# Patient Record
Sex: MALE | Race: ASIAN | Employment: UNEMPLOYED | URBAN - METROPOLITAN AREA
[De-identification: names, ages, dates, MRNs, and addresses within clinical notes are randomized per-mention and may not be internally consistent; named-entity substitution may affect disease eponyms.]

---

## 2017-04-18 LAB
CREAT SERPL-MCNC: 0.81 MG/DL (ref 0.72–1.25)
GFR SERPL CREATININE-BSD FRML MDRD: >60 ML/MIN/1.73M2

## 2017-10-24 LAB
GLUCOSE SERPL-MCNC: 82 MG/DL (ref 65–100)
POTASSIUM SERPL-SCNC: 3.9 MMOL/L (ref 3.5–5)

## 2017-10-26 LAB
CHOLEST SERPL-MCNC: 138 MG/DL (ref 100–199)
HDLC SERPL-MCNC: 61 MG/DL
LDLC SERPL CALC-MCNC: 60 MG/DL
NONHDLC SERPL-MCNC: 77 MG/DL
TRIGL SERPL-MCNC: 85 MG/DL

## 2018-01-17 ENCOUNTER — TRANSFERRED RECORDS (OUTPATIENT)
Dept: HEALTH INFORMATION MANAGEMENT | Facility: CLINIC | Age: 33
End: 2018-01-17

## 2018-01-17 LAB — INR PPP: 1

## 2019-01-25 ENCOUNTER — OFFICE VISIT (OUTPATIENT)
Dept: FAMILY MEDICINE | Facility: CLINIC | Age: 34
End: 2019-01-25
Payer: MEDICAID

## 2019-01-25 VITALS
DIASTOLIC BLOOD PRESSURE: 81 MMHG | HEIGHT: 65 IN | WEIGHT: 205.2 LBS | TEMPERATURE: 97.5 F | OXYGEN SATURATION: 96 % | BODY MASS INDEX: 34.19 KG/M2 | HEART RATE: 68 BPM | SYSTOLIC BLOOD PRESSURE: 122 MMHG

## 2019-01-25 DIAGNOSIS — Z23 NEED FOR PROPHYLACTIC VACCINATION AND INOCULATION AGAINST INFLUENZA: ICD-10-CM

## 2019-01-25 DIAGNOSIS — Z00.00 ENCOUNTER FOR ROUTINE ADULT HEALTH EXAMINATION WITHOUT ABNORMAL FINDINGS: Primary | ICD-10-CM

## 2019-01-25 DIAGNOSIS — Z73.9 DIFFICULTY WITH LIFE MANAGEMENT: ICD-10-CM

## 2019-01-25 DIAGNOSIS — Z11.3 SCREEN FOR STD (SEXUALLY TRANSMITTED DISEASE): ICD-10-CM

## 2019-01-25 LAB
CHOLEST SERPL-MCNC: 153 MG/DL
GLUCOSE SERPL-MCNC: 101 MG/DL (ref 70–99)
HDLC SERPL-MCNC: 66 MG/DL
LDLC SERPL CALC-MCNC: 62 MG/DL
NONHDLC SERPL-MCNC: 87 MG/DL
TRIGL SERPL-MCNC: 125 MG/DL

## 2019-01-25 PROCEDURE — 87591 N.GONORRHOEAE DNA AMP PROB: CPT | Performed by: PHYSICIAN ASSISTANT

## 2019-01-25 PROCEDURE — 90471 IMMUNIZATION ADMIN: CPT | Performed by: PHYSICIAN ASSISTANT

## 2019-01-25 PROCEDURE — 36415 COLL VENOUS BLD VENIPUNCTURE: CPT | Performed by: PHYSICIAN ASSISTANT

## 2019-01-25 PROCEDURE — 86780 TREPONEMA PALLIDUM: CPT | Performed by: PHYSICIAN ASSISTANT

## 2019-01-25 PROCEDURE — 87491 CHLMYD TRACH DNA AMP PROBE: CPT | Performed by: PHYSICIAN ASSISTANT

## 2019-01-25 PROCEDURE — 99395 PREV VISIT EST AGE 18-39: CPT | Mod: 25 | Performed by: PHYSICIAN ASSISTANT

## 2019-01-25 PROCEDURE — 82947 ASSAY GLUCOSE BLOOD QUANT: CPT | Performed by: PHYSICIAN ASSISTANT

## 2019-01-25 PROCEDURE — 90686 IIV4 VACC NO PRSV 0.5 ML IM: CPT | Performed by: PHYSICIAN ASSISTANT

## 2019-01-25 PROCEDURE — 87389 HIV-1 AG W/HIV-1&-2 AB AG IA: CPT | Performed by: PHYSICIAN ASSISTANT

## 2019-01-25 PROCEDURE — 80061 LIPID PANEL: CPT | Performed by: PHYSICIAN ASSISTANT

## 2019-01-25 ASSESSMENT — MIFFLIN-ST. JEOR: SCORE: 1810.78

## 2019-01-25 NOTE — PROGRESS NOTES
"  SUBJECTIVE:   CC: Jorge Lerner is an 33 year old male who presents for preventive health visit.     Healthy Habits:    Do you get at least three servings of calcium containing foods daily (dairy, green leafy vegetables, etc.)? yes    Amount of exercise or daily activities, outside of work: No    Problems taking medications regularly No    Medication side effects: No    Have you had an eye exam in the past two years? no    Do you see a dentist twice per year? No - has insurance     Do you have sleep apnea, excessive snoring or daytime drowsiness?no      PROBLEMS TO ADD ON...  Patient informed that anything we discuss that is not related to preventative medicine, may be billed for; patient verbalizes understanding.    Fasting for labs    Red hue to hands  Denies pain  They do not turn white or purple  Not necessarily worse in cold or hot weather    Would like a referral for counseling  Feels \"stuck\" in life    STD screen  Had intercourse with a new partner recently  Randomly the head of his penis will have discomfort  Denies penile discharge and dysuria    -------------------------------------    Today's PHQ-2 Score:   PHQ-2 ( 1999 Pfizer) 1/25/2019   Q1: Little interest or pleasure in doing things 0   Q2: Feeling down, depressed or hopeless 0   PHQ-2 Score 0       Abuse: Current or Past(Physical, Sexual or Emotional)- No  Do you feel safe in your environment? Yes    Social History     Tobacco Use     Smoking status: Current Every Day Smoker     Types: Cigarettes     Smokeless tobacco: Never Used   Substance Use Topics     Alcohol use: No     If you drink alcohol do you typically have >3 drinks per day or >7 drinks per week? Yes - AUDIT SCORE:  11  AUDIT - Alcohol Use Disorders Identification Test - Reproduced from the World Health Organization Audit 2001 (Second Edition) 1/25/2019   1.  How often do you have a drink containing alcohol? 4 or more times a week   2.  How many drinks containing alcohol do you have on a " typical day when you are drinking? 5 or 6   3.  How often do you have five or more drinks on one occasion? Weekly   4.  How often during the last year have you found that you were not able to stop drinking once you had started? Never   5.  How often during the last year have you failed to do what was normally expected of you because of drinking? Never   6.  How often during the last year have you needed a first drink in the morning to get yourself going after a heavy drinking session? Never   7.  How often during the last year have you had a feeling of guilt or remorse after drinking? Never   8.  How often during the last year have you been unable to remember what happened the night before because of your drinking? Monthly   9.  Have you or someone else been injured because of your drinking? No   10. Has a relative, friend, doctor or other health care worker been concerned about your drinking or suggested you cut down? No   TOTAL SCORE 11                         Last PSA: No results found for: PSA    Reviewed orders with patient. Reviewed health maintenance and updated orders accordingly - Yes  Labs reviewed in EPIC    Reviewed and updated as needed this visit by clinical staff  Allergies  Meds         Reviewed and updated as needed this visit by Provider        No past medical history on file.     ROS:  Other than what is noted in the HPI and PMH a complete review of systems is otherwise negative including: Constitutional, HEENT, endocrine, cardiovascular, respiratory, GI/, musculoskeletal, neuro, and psychiatric.     OBJECTIVE:   There were no vitals taken for this visit.  EXAM:  GENERAL: healthy, alert and no distress  EYES: Eyes grossly normal to inspection, PERRL and conjunctivae and sclerae normal  HENT: ear canals and TM's normal, nose and mouth without ulcers or lesions  NECK: no adenopathy, no asymmetry, masses, or scars and thyroid normal to palpation  RESP: lungs clear to auscultation - no rales,  "rhonchi or wheezes  CV: regular rate and rhythm, normal S1 S2, no S3 or S4, no murmur, click or rub, no peripheral edema and peripheral pulses strong  ABDOMEN: soft, nontender, no hepatosplenomegaly, no masses and bowel sounds normal  MS: no gross musculoskeletal defects noted, no edema  SKIN: no suspicious lesions or rashes  NEURO: Normal strength and tone, mentation intact and speech normal  PSYCH: mentation appears normal, affect normal/bright    ASSESSMENT/PLAN:       ICD-10-CM    1. Encounter for routine adult health examination without abnormal findings Z00.00 Lipid panel reflex to direct LDL Fasting     Glucose     OPTOMETRY REFERRAL   2. Need for prophylactic vaccination and inoculation against influenza Z23 HC FLU VAC PRESRV FREE QUAD SPLIT VIR 3+YRS IM  [60018]          ADMIN VACCINE, FIRST [14596]   3. Screen for STD (sexually transmitted disease) Z11.3 Neisseria gonorrhoeae PCR     HIV Antigen Antibody Combo     Chlamydia trachomatis PCR     Treponema Abs w Reflex to RPR and Titer   4. Difficulty with life management Z73.9 MENTAL HEALTH REFERRAL  - Adult; Outpatient Treatment; Individual/Couples/Family/Group Therapy/Health Psychology; [facility] [place]: [facility] [phone]; We will contact you to schedule the appointment or please call with any questions       STD screen and labs today.  Referral to counseling.  Follow up in 3 years if labs normal.      COUNSELING:  Reviewed preventive health counseling, as reflected in patient instructions    BP Readings from Last 1 Encounters:   07/03/15 126/74     Estimated body mass index is 27.6 kg/m  as calculated from the following:    Height as of 7/3/15: 1.676 m (5' 6\").    Weight as of 7/3/15: 77.6 kg (171 lb).     reports that he has been smoking cigarettes.  he has never used smokeless tobacco.      Counseling Resources:  ATP IV Guidelines  Pooled Cohorts Equation Calculator  FRAX Risk Assessment  ICSI Preventive Guidelines  Dietary Guidelines for " Americans, 2010  USDA's MyPlate  ASA Prophylaxis  Lung CA Screening    Martha Stone PA-C  [facility] [place]

## 2019-01-25 NOTE — NURSING NOTE
Injectable Influenza Immunization Documentation    1. Is the person to be vaccinated sick today? no    2. Does the person to be vaccinated have an allergy to eggs or a component of the vaccine? no    3. Has the person to be vaccinated today ever had a serious reaction to an influenza vaccine in the past? no    4. Has the person to be vaccinated ever had [place] syndrome? no    Form completed by Sarah Trammell CMA

## 2019-01-25 NOTE — LETTER
January 28, 2019      Jorge Lerner  [address]  [place] [address]        Dear ,    We are writing to inform you of your test results.    Your STD screen is negative.   Your blood sugar is elevated by just 2 points. Cutting back on carbs/starches is important to prevent the development of diabetes.   Your LDL (bad) cholesterol is <160 and therefore at goal - no signs of high cholesterol.     Resulted Orders   Lipid panel reflex to direct LDL Fasting   Result Value Ref Range    Cholesterol 153 <200 mg/dL    Triglycerides 125 <150 mg/dL      Comment:      Fasting specimen    HDL Cholesterol 66 >39 mg/dL    LDL Cholesterol Calculated 62 <100 mg/dL      Comment:      Desirable:       <100 mg/dl    Non HDL Cholesterol 87 <130 mg/dL   Glucose   Result Value Ref Range    Glucose 101 (H) 70 - 99 mg/dL      Comment:      Fasting specimen   Neisseria gonorrhoeae PCR   Result Value Ref Range    Specimen Descrip Urine     N Gonorrhea PCR Negative NEG^Negative      Comment:      Negative for N. gonorrhoeae rRNA by transcription mediated amplification.  A negative result by transcription mediated amplification does not preclude   the presence of N. gonorrhoeae infection because results are dependent on   proper and adequate collection, absence of inhibitors, and sufficient rRNA to   be detected.     HIV Antigen Antibody Combo   Result Value Ref Range    HIV Antigen Antibody Combo Nonreactive NR^Nonreactive          Comment:      HIV-1 p24 Ag & HIV-1/HIV-2 Ab Not Detected   Chlamydia trachomatis PCR   Result Value Ref Range    Specimen Description Urine     Chlamydia Trachomatis PCR Negative NEG^Negative      Comment:      Negative for C. trachomatis rRNA by transcription mediated amplification.  A negative result by transcription mediated amplification does not preclude   the presence of C. trachomatis infection because results are dependent on   proper and adequate collection, absence of inhibitors, and sufficient rRNA to    be detected.     Treponema Abs w Reflex to RPR and Titer   Result Value Ref Range    Treponema Antibodies Nonreactive NR^Nonreactive       If you have any questions or concerns, please call the clinic at the number listed above.       Sincerely,        Martha Stone PA-C/raduo

## 2019-01-26 LAB — T PALLIDUM AB SER QL: NONREACTIVE

## 2019-01-27 LAB
C TRACH DNA SPEC QL NAA+PROBE: NEGATIVE
N GONORRHOEA DNA SPEC QL NAA+PROBE: NEGATIVE
SPECIMEN SOURCE: NORMAL
SPECIMEN SOURCE: NORMAL

## 2019-01-28 LAB — HIV 1+2 AB+HIV1 P24 AG SERPL QL IA: NONREACTIVE

## 2019-01-28 NOTE — RESULT ENCOUNTER NOTE
Please send the following letter to the patient:    Jorge,    Your STD screen is negative.  Your blood sugar is elevated by just 2 points. Cutting back on carbs/starches is important to prevent the development of diabetes.   Your LDL (bad) cholesterol is <160 and therefore at goal - no signs of high cholesterol.     Please call me with any questions or concerns.          Martha Stone PA-C

## 2019-01-29 ENCOUNTER — TELEPHONE (OUTPATIENT)
Dept: FAMILY MEDICINE | Facility: CLINIC | Age: 34
End: 2019-01-29

## 2019-01-29 NOTE — TELEPHONE ENCOUNTER
Notes recorded by Martha Stone PA-C on 1/28/2019 at 11:13 AM CST  Please send the following letter to the patient:    Jorge,    Your STD screen is negative.  Your blood sugar is elevated by just 2 points. Cutting back on carbs/starches is important to prevent the development of diabetes.   Your LDL (bad) cholesterol is <160 and therefore at goal - no signs of high cholesterol.       Patient informed of results, verbalized understanding and had no further questions or concerns. Informed letter mailed to patient 1/28/19

## 2019-02-12 ENCOUNTER — OFFICE VISIT (OUTPATIENT)
Dept: OTOLARYNGOLOGY | Facility: CLINIC | Age: 34
End: 2019-02-12
Payer: MEDICAID

## 2019-02-12 VITALS
WEIGHT: 205 LBS | DIASTOLIC BLOOD PRESSURE: 85 MMHG | HEART RATE: 83 BPM | BODY MASS INDEX: 34.16 KG/M2 | HEIGHT: 65 IN | OXYGEN SATURATION: 97 % | SYSTOLIC BLOOD PRESSURE: 125 MMHG

## 2019-02-12 DIAGNOSIS — H60.391 INFECTIVE OTITIS EXTERNA, RIGHT: Primary | ICD-10-CM

## 2019-02-12 PROCEDURE — 99213 OFFICE O/P EST LOW 20 MIN: CPT | Performed by: OTOLARYNGOLOGY

## 2019-02-12 RX ORDER — NEOMYCIN SULFATE, POLYMYXIN B SULFATE, HYDROCORTISONE 3.5; 10000; 1 MG/ML; [USP'U]/ML; MG/ML
3-4 SOLUTION/ DROPS AURICULAR (OTIC) 3 TIMES DAILY
Qty: 10 ML | Refills: 0 | Status: SHIPPED | OUTPATIENT
Start: 2019-02-12 | End: 2019-02-19

## 2019-02-12 ASSESSMENT — MIFFLIN-ST. JEOR: SCORE: 1809.84

## 2019-02-12 NOTE — PATIENT INSTRUCTIONS
General Scheduling Information  To schedule your CT/MRI scan, please contact [place] [facility] at [phone]   [address] [place] [address]    To schedule your Surgery, please contact our Specialty Schedulers at [phone]    ENT Clinic Locations Clinic Hours Telephone Number     [facility]  [address] [place] [address]   Tuesday:       8:00am -- 4:00pm    Wednesday:  8:00am - 4:00pm   To schedule an appointment with   Dr. Camacho,   please contact our   Specialty Scheduling Department at:     [phone]       [facility]ell  [address] [place] [address]   Friday:          8:00am - 4:00pm         Urgent Care Locations Clinic Hours Telephone Numbers     [facility] Orlando  [address] [place] [address]     Monday-Friday:     11:00pm - 9:00pm    Saturday-Sunday:  9:00am - 5:00pm   [phone]     [facility] Sewell  [address] [place] [address]     Monday-Friday:      5:00pm - 9:00pm     Saturday-Sunday:  9:00am - 5:00pm   [phone]

## 2019-02-12 NOTE — LETTER
2/12/2019         RE: Jorge Lerner  [address] [facility] [address]        Dear Colleague,    Thank you for referring your patient, Jorge Lerner, to the [facility]. Please see a copy of my visit note below.    Chief Complaint - right ear itching    History of Present Illness - Jorge Lerner is a 33 year old male who presents to me today with itching right ear. No pain or hearing loss. This has been intermittent for years. He has seen Dr. Diaz for this in the past. He was given ciprodex then for otitis externa. Left ear feels fine. No otorrhea. He denies eczema, but sometimes has dry hands.     Past Medical History -   Patient Active Problem List   Diagnosis     Eczematous dermatitis       Current Medications -   Current Outpatient Medications:      neomycin-polymyxin-hydrocortisone (CORTISPORIN) 3.5-61559-0 otic solution, Place 3-4 drops into the right ear 3 times daily for 7 days, Disp: 10 mL, Rfl: 0     ibuprofen (ADVIL,MOTRIN) 800 MG tablet, Take 1 tablet by mouth every 8 hours as needed for pain., Disp: 30 tablet, Rfl: 0     NO ACTIVE MEDICATIONS, , Disp: , Rfl:     Allergies - No Known Allergies    Social History -   Social History     Socioeconomic History     Marital status: Single     Spouse name: None     Number of children: None     Years of education: None     Highest education level: None   Social Needs     Financial resource strain: None     Food insecurity - worry: None     Food insecurity - inability: None     Transportation needs - medical: None     Transportation needs - non-medical: None   Occupational History     None   Tobacco Use     Smoking status: Current Every Day Smoker     Types: Cigarettes     Smokeless tobacco: Never Used   Substance and Sexual Activity     Alcohol use: No     Drug use: No     Sexual activity: None   Other Topics Concern     Parent/sibling w/ CABG, MI or angioplasty before 65F 55M? Not Asked   Social History Narrative     None       Family History -   Family  "History   Problem Relation Age of Onset     Hypertension Mother      Hypertension Maternal Uncle        Review of Systems - As per HPI and PMHx, otherwise 7 system review of the head and neck negative.    Physical Exam  /85   Pulse 83   Ht 1.648 m (5' 4.88\")   Wt 93 kg (205 lb)   SpO2 97%   BMI 34.24 kg/m     General - The patient is in no distress.  Alert and oriented to person and place, answers questions and cooperates with examination appropriately.   Voice and Breathing - The patient was breathing comfortably without the use of accessory muscles. There was no wheezing, stridor, or stertor.  The patients voice was clear and strong.  Ears - The right ear was examined. It showed erythema and edema of the medial canal. There was moist debri ion the tympanic membrane. Using the binocular microscope I used a #5 suction and suctioned and debrided the otorrhea and debri deep in the canal. I could then see the tympanic membrane. It appeared intact. No evidence of middle ear effusion. The left ear was then examined. The canal was nonedematous and nonerythematous. No evidence of infection. The tympanic membrane was intact and the middle ear was well aerated.   Eyes - Extraocular movements intact.  Sclera were not icteric or injected.  Neck - Palpation of the occipital, submental, submandibular, internal jugular chain, and supraclavicular nodes did not demonstrateany abnormal lymph nodes or masses. No parotid masses. Palpation of the thyroid was soft and smooth, with no nodules or goiter appreciated.  The trachea was mobile and midline.  Neurological - Cranial nerves 2 through 12 were grossly intact. House-Brackmann grade 1 out of 6 bilaterally.      Assessment and Plan - Jorge Lerner is a 33 year old male has a right ear otitis externa. It seems mostly confined to the medial canal. I debrided the canal under the microscope. I will prescribe cortisporin. If this fails he will contact me.     Milton Camacho " MD  Otolaryngology  [facility]        Again, thank you for allowing me to participate in the care of your patient.        Sincerely,        Milton Camacho MD

## 2019-02-12 NOTE — PROGRESS NOTES
Chief Complaint - right ear itching    History of Present Illness - Jorge Lerner is a 33 year old male who presents to me today with itching right ear. No pain or hearing loss. This has been intermittent for years. He has seen Dr. Diaz for this in the past. He was given ciprodex then for otitis externa. Left ear feels fine. No otorrhea. He denies eczema, but sometimes has dry hands.     Past Medical History -   Patient Active Problem List   Diagnosis     Eczematous dermatitis       Current Medications -   Current Outpatient Medications:      neomycin-polymyxin-hydrocortisone (CORTISPORIN) 3.5-31285-0 otic solution, Place 3-4 drops into the right ear 3 times daily for 7 days, Disp: 10 mL, Rfl: 0     ibuprofen (ADVIL,MOTRIN) 800 MG tablet, Take 1 tablet by mouth every 8 hours as needed for pain., Disp: 30 tablet, Rfl: 0     NO ACTIVE MEDICATIONS, , Disp: , Rfl:     Allergies - No Known Allergies    Social History -   Social History     Socioeconomic History     Marital status: Single     Spouse name: None     Number of children: None     Years of education: None     Highest education level: None   Social Needs     Financial resource strain: None     Food insecurity - worry: None     Food insecurity - inability: None     Transportation needs - medical: None     Transportation needs - non-medical: None   Occupational History     None   Tobacco Use     Smoking status: Current Every Day Smoker     Types: Cigarettes     Smokeless tobacco: Never Used   Substance and Sexual Activity     Alcohol use: No     Drug use: No     Sexual activity: None   Other Topics Concern     Parent/sibling w/ CABG, MI or angioplasty before 65F 55M? Not Asked   Social History Narrative     None       Family History -   Family History   Problem Relation Age of Onset     Hypertension Mother      Hypertension Maternal Uncle        Review of Systems - As per HPI and PMHx, otherwise 7 system review of the head and neck negative.    Physical Exam  BP  "125/85   Pulse 83   Ht 1.648 m (5' 4.88\")   Wt 93 kg (205 lb)   SpO2 97%   BMI 34.24 kg/m    General - The patient is in no distress.  Alert and oriented to person and place, answers questions and cooperates with examination appropriately.   Voice and Breathing - The patient was breathing comfortably without the use of accessory muscles. There was no wheezing, stridor, or stertor.  The patients voice was clear and strong.  Ears - The right ear was examined. It showed erythema and edema of the medial canal. There was moist debri ion the tympanic membrane. Using the binocular microscope I used a #5 suction and suctioned and debrided the otorrhea and debri deep in the canal. I could then see the tympanic membrane. It appeared intact. No evidence of middle ear effusion. The left ear was then examined. The canal was nonedematous and nonerythematous. No evidence of infection. The tympanic membrane was intact and the middle ear was well aerated.   Eyes - Extraocular movements intact.  Sclera were not icteric or injected.  Neck - Palpation of the occipital, submental, submandibular, internal jugular chain, and supraclavicular nodes did not demonstrateany abnormal lymph nodes or masses. No parotid masses. Palpation of the thyroid was soft and smooth, with no nodules or goiter appreciated.  The trachea was mobile and midline.  Neurological - Cranial nerves 2 through 12 were grossly intact. House-Brackmann grade 1 out of 6 bilaterally.      Assessment and Plan - Jorge Lerner is a 33 year old male has a right ear otitis externa. It seems mostly confined to the medial canal. I debrided the canal under the microscope. I will prescribe cortisporin. If this fails he will contact me.     Milton Camacho MD  Otolaryngology  [facility]      "

## 2020-07-15 ENCOUNTER — NURSE TRIAGE (OUTPATIENT)
Dept: NURSING | Facility: CLINIC | Age: 35
End: 2020-07-15

## 2020-07-15 NOTE — TELEPHONE ENCOUNTER
Patient called with a request to talk to a therapist or counselor. He denied any panic attacks or feelings of wanting to harm himself. Patient was cooperative but did not want to talk in any detail about why he wants to talk to a counselor, he would prefer to just talk directly to a counselor or therapist. He does have a history of traumatic brain injury in 2005 and had some help with therapists at that time. He has not had any contact with them in several years and is just looking for someone that he can talk to.    Patient understood that he would need to get a referral from a PCP for a therapist and he was okay with that. Patient then decided he just wanted to talk to the  and not answer any more questions because he wanted to get on with the process of talking to a therapist. He does not live alone and has others around him for support but needs a therapist now.    Patient was no longer on the line when I connected with the  so she was given the cell phone number to call him back and she was going to call him back to schedule a virtual visit with a PCP.    Additional Information    Negative: Severe difficulty breathing (e.g., struggling for each breath, speaks in single words)    Negative: Bluish (or gray) lips or face    Negative: Difficult to awaken or acting confused  (e.g., disoriented, slurred speech)    Negative: Hysterical or combative behavior    Negative: Sounds like a life-threatening emergency to the triager    Negative: Chest pain    Negative: Palpitations, skipped heart beat, or rapid heart beat    Negative: Cough is main symptom    Negative: Suicide thoughts, threats, attempts, or questions    Negative: Depression is main problem or symptom (e.g., feelings of sadness or hopelessness)    Negative: Difficulty breathing and persists > 10 minutes and not relieved by reassurance provided by triager    Negative: Lightheadedness or dizziness and persists > 10 minutes and not relieved by  "reassurance provided by triager    Negative: Alcohol or drug abuse, known or suspected, and feeling very shaky (i.e., visible tremors of hands)    Negative: Patient sounds very sick or weak to the triager    Negative: Patient sounds very upset or troubled to the triager    Symptoms of anxiety or panic attack and is a chronic symptom (recurrent or ongoing AND present > 4 weeks)    Answer Assessment - Initial Assessment Questions  1. CONCERN: \"What happened that made you call today?\"      Need to talk to someone, nothing specific today  2. ANXIETY SYMPTOM SCREENING: \"Can you describe how you have been feeling?\"  (e.g., tense, restless, panicky, anxious, keyed up, trouble sleeping, trouble concentrating)      none  3. ONSET: \"How long have you been feeling this way?\"      For a while  4. RECURRENT: \"Have you felt this way before?\"  If yes: \"What happened that time?\" \"What helped these feelings go away in the past?\"       ys  5. RISK OF HARM - SUICIDAL IDEATION:  \"Do you ever have thoughts of hurting or killing yourself?\"  (e.g., yes, no, no but preoccupation with thoughts about death)    - INTENT:  \"Do you have thoughts of hurting or killing yourself right NOW?\" (e.g., yes, no, N/A)    - PLAN: \"Do you have a specific plan for how you would do this?\" (e.g., gun, knife, overdose, no plan, N/A)     no  6. RISK OF HARM - HOMICIDAL IDEATION:  \"Do you ever have thoughts of hurting or killing someone else?\"  (e.g., yes, no, no but preoccupation with thoughts about death)    - INTENT:  \"Do you have thoughts of hurting or killing someone right NOW?\" (e.g., yes, no, N/A)    - PLAN: \"Do you have a specific plan for how you would do this?\" (e.g., gun, knife, no plan, N/A)       no  7. FUNCTIONAL IMPAIRMENT: \"How have things been going for you overall in your life? Have you had any more difficulties than usual doing your normal daily activities?\"  (e.g., better, same, worse; self-care, school, work, interactions)      yes  8. " "SUPPORT: \"Who is with you now?\" \"Who do you live with?\" \"Do you have family or friends nearby who you can talk to?\"       Lives with someone  9. THERAPIST: \"Do you have a counselor or therapist? Name?\"      Needs a therapist  10. STRESSORS: \"Has there been any new stress or recent changes in your life?\"        Doesn't answer  11. CAFFEINE ABUSE: \"Do you drink caffeinated beverages, and how much each day?\" (e.g., coffee, tea, nava)        Na   12. SUBSTANCE ABUSE: \"Do you use any illegal drugs or alcohol?\"        Doesn't answer  13. OTHER SYMPTOMS: \"Do you have any other physical symptoms right now?\" (e.g., chest pain, palpitations, difficulty breathing, fever)        no  14. PREGNANCY: \"Is there any chance you are pregnant?\" \"When was your last menstrual period?\"        no    Protocols used: ANXIETY AND PANIC ATTACK-A-OH    Rosita Montes De Oca RN on 7/15/2020 at 11:55 AM    "

## 2021-05-28 ENCOUNTER — RECORDS - HEALTHEAST (OUTPATIENT)
Dept: ADMINISTRATIVE | Facility: CLINIC | Age: 36
End: 2021-05-28